# Patient Record
Sex: FEMALE | Race: OTHER | HISPANIC OR LATINO | URBAN - METROPOLITAN AREA
[De-identification: names, ages, dates, MRNs, and addresses within clinical notes are randomized per-mention and may not be internally consistent; named-entity substitution may affect disease eponyms.]

---

## 2023-03-07 ENCOUNTER — EMERGENCY (EMERGENCY)
Facility: HOSPITAL | Age: 28
LOS: 1 days | Discharge: HOME | End: 2023-03-07
Attending: INTERNAL MEDICINE | Admitting: INTERNAL MEDICINE
Payer: MEDICAID

## 2023-03-07 ENCOUNTER — APPOINTMENT (OUTPATIENT)
Dept: OBGYN | Facility: CLINIC | Age: 28
End: 2023-03-07

## 2023-03-07 VITALS
OXYGEN SATURATION: 98 % | WEIGHT: 169.98 LBS | HEART RATE: 82 BPM | TEMPERATURE: 97 F | HEIGHT: 65 IN | RESPIRATION RATE: 17 BRPM | DIASTOLIC BLOOD PRESSURE: 82 MMHG | SYSTOLIC BLOOD PRESSURE: 122 MMHG

## 2023-03-07 PROBLEM — Z00.00 ENCOUNTER FOR PREVENTIVE HEALTH EXAMINATION: Status: ACTIVE | Noted: 2023-03-07

## 2023-03-07 PROCEDURE — 87077 CULTURE AEROBIC IDENTIFY: CPT

## 2023-03-07 PROCEDURE — 87070 CULTURE OTHR SPECIMN AEROBIC: CPT

## 2023-03-07 PROCEDURE — 10061 I&D ABSCESS COMP/MULTIPLE: CPT

## 2023-03-07 PROCEDURE — 99284 EMERGENCY DEPT VISIT MOD MDM: CPT | Mod: 25

## 2023-03-07 PROCEDURE — 56405 I&D VULVA/PERINEAL ABSCESS: CPT

## 2023-03-07 PROCEDURE — 99283 EMERGENCY DEPT VISIT LOW MDM: CPT | Mod: 25

## 2023-03-07 RX ORDER — IBUPROFEN 200 MG
1 TABLET ORAL
Qty: 12 | Refills: 0
Start: 2023-03-07 | End: 2023-03-09

## 2023-03-07 RX ADMIN — Medication 1 TABLET(S): at 13:26

## 2023-03-07 NOTE — ED PROVIDER NOTE - PATIENT PORTAL LINK FT
You can access the FollowMyHealth Patient Portal offered by [facility] by registering at the following website: [email or web address]. By joining [facility]’s FollowMyHealth portal, you will also be able to view your health information using other applications (apps) compatible with our system.

## 2023-03-07 NOTE — ED ADULT NURSE NOTE - HISTORY OF COVID-19 VACCINATION
LVMTCB  Repeat CT was missed prior to appt tomorrow. Pt can do a walk in [facility] [address] if time permits. Transportation is not able to change  time. Patient should arrive about 2 pm to [facility].   Yes

## 2023-03-07 NOTE — ED ADULT NURSE NOTE - OBJECTIVE STATEMENT
Pt came from home with c/o right labial abscess x 6 days. Pt with hx of 3 occurrences of labial abscess in the past. Pt stating that the abscess is painful, rating the pain 7/10. No fever/chills, abdominal pain, n/v/d, urinary symptoms or any other complaints.

## 2023-03-07 NOTE — ED PROVIDER NOTE - NSFOLLOWUPINSTRUCTIONS_ED_ALL_ED_FT
Quiste de Anneliese    Bartholin's Cyst       El quiste de Anneliese es un saco lleno de líquido que se forma huber resultado de sanrda obstrucción a lo dom del tubo (conducto) de la glándula de Anneliese. Las glándulas de Anneliese son pequeñas glándulas ubicadas en los pliegues de la piel alrededor del orificio de la vagina (labios de la vulva). Estas glándulas producen un líquido que humedece o lubrica la parte externa de la vagina jules las relaciones sexuales.    Un quiste que no es ankit ni está infectado puede no causar problemas ni requerir tratamiento. Si el quiste se infecta con bacterias, se denomina absceso de Anneliese. Un absceso puede causar síntomas huber dolor e hinchazón y es más probable que requiera tratamiento.      ¿Cuáles son las causas?    Esta afección puede producirse por un conducto de la glándula de Anneliese obstruido. Estos conductos pueden obstruirse debido a la acumulación natural de líquidos y aceites. La presencia de bacterias dentro del quiste puede causar sandra infección.    En muchos de los casos, se desconoce la causa.      ¿Cuáles son los signos o síntomas?    Entre los síntomas, se pueden incluir los siguientes:  •Un bulto o sandra hinchazón en los labios de la vulva, cerca del orificio inferior de la vagina.      •Molestias o dolor. [place] puede empeorar al tener relaciones sexuales o al caminar.      •Enrojecimiento, hinchazón y secreción de líquido en el área. Estos podrían ser signos de un absceso.      La gravedad de los síntomas depende del tamaño del quiste y si está infectado. La infección agrava los síntomas.      ¿Cómo se diagnostica?    Esta afección se puede diagnosticar en función de lo siguiente:  •Los síntomas y los antecedentes médicos.      •Un examen físico para detectar hinchazón en el área vaginal. Es posible que deba acostarse boca arriba en sandra ginny con los pies en los soportes para que le realicen el examen.      •Análisis de lloyd para verificar si hay infecciones.      •Extracción de sandra muestra de líquido del quiste o absceso (biopsia) para realizar pruebas.      Es posible que trabaje con un médico especializado en la debby de las mujeres (ginecólogo) para recibir un diagnóstico y tratamiento.      ¿Cómo se trata?    Si el quiste es pequeño, no está infectado y no causa síntomas, moon vez no sea necesario administrar tratamiento. Estos quistes a menudo desaparecen por sí solos, con cuidados en el hogar huber judith calientes o compresas tibias.    Si tiene un quiste o absceso ankit, el tratamiento puede incluir lo siguiente:  •Antibióticos.    •Un procedimiento para drenar el líquido que se encuentra en el interior del quiste o absceso. Estos procedimientos involucran sandra incisión en el quiste o absceso para drenar el líquido y luego puede realizarse alguno de los siguientes procedimientos:  •Puede colocarse un pequeño tubo scott (catéter) dentro del quiste o absceso para evitar que se cierre y vuelva a llenarse de líquido (fistulización). El catéter se extraerá en la visita de seguimiento.      •Los bordes de la incisión pueden suturarse con puntos en la piel para que el quiste o absceso se mantenga abierto (marsupialización). [place] permite que siga drenando y no se vuelva a llenar de líquido.        Si tiene quistes o abscesos que vuelven a formarse (recurrentes) y ayala requerido incisión y drenaje muchas veces, el médico puede plantearle la posibilidad de sandra cirugía para extirpar la glándula de Bartolino.      Siga estas instrucciones en arambula casa:    Medicamentos     •Use los medicamentos de venta makayla y los recetados solamente huber se lo haya indicado el médico.      •Si le recetaron un antibiótico, tómelo huber se lo haya indicado el médico. No deje de jian o usar el antibiótico aunque la afección mejore.      Control del dolor y de la hinchazón     •Pruebe con un baño de asiento para disminuir el dolor y la hinchazón. Un baño de asiento es un baño con agua tibia en el cual el agua solo le llega hasta la cadera y debe cubrir las nalgas. Puede jian judith de asiento varias veces al día.    •Aplique calor en la felecia afectada con la frecuencia que sea necesaria. Use la drew de calor que el médico le recomiende, huber sandra compresa de calor húmedo o sandra almohadilla térmica.   •Coloque sandra toalla entre la piel y la drew de calor.       •Aplique calor jules 20 a 30 minutos.       •Retire la drew de calor si la piel se pone de color rogers brillante. [place] es especialmente importante si no puede sentir dolor, calor o frío. Puede correr un riesgo mayor de sufrir quemaduras. No se quede dormida con la almohadilla térmica colocada.        Indicaciones generales     •Si se drenó el quiste o absceso, siga las indicaciones del médico acerca del cuidado de la herida. Utilice toallas femeninas cuando lo necesite para absorber cualquier secreción.      • No apriete ni kierra presión sobre el quiste.      • No tenga relaciones sexuales hasta que el quiste haya desaparecido o la herida del drenaje haya sanado.    •[place] estas medidas para ayudar a prevenir que vuelva a formarse un quiste de Bartolino y que se desarrollen otros quistes de Bartolino:  •[place] un baño o sandra ducha sandra vez día. Higienice el área vaginal con agua y un jabón suave cuando se bañe.      •Practique el sexo seguro para prevenir las ITS. Hable con el médico sobre cómo prevenir las ITS y qué formas de métodos de control de la natalidad (anticonceptivos) pueden ser mejores para usted.        •Cumpla con todas las visitas de seguimiento. [place] es importante.        Comuníquese con un médico si:    •Tiene fiebre.      •Tiene más enrojecimiento, hinchazón o dolor alrededor del quiste.      •Tiene líquido, lloyd, pus o mal olor que proviene del quiste.      •Tiene un quiste que aumenta de tamaño o vuelve a formarse.        Resumen    •El quiste de Bartolino es un saco lleno de líquido que se forma huber resultado de sandra obstrucción a lo dom del conducto de la glándula de Bartolino.      •Si el quiste es pequeño, no está infectado y no causa síntomas, moon vez no sea necesario administrar tratamiento.      •Si tiene un quiste o absceso ankit, es posible que el médico realice un procedimiento para drenar el líquido.      •Si tiene quistes o abscesos que vuelven a formarse (recurrentes) y ayala requerido incisión y drenaje muchas veces, el médico puede plantearle la posibilidad de sandra cirugía para extirpar la glándula de Bartolino.      Esta información no tiene huber fin reemplazar el consejo del médico. Asegúrese de hacerle al médico cualquier pregunta que tenga.      Document Revised: 07/09/2021 Document Reviewed: 07/09/2021    Elsevier Patient Education © 2023 Elsevier Inc. Kierra un seguimiento con la clínica según las indicaciones.  Mantenga el embalaje en arambula lugar hasta la melo fu  judith tibios  [place] Augmentin según lo prescrito  [place] motrin 600 mg cada 6 horas según sea necesario para el dolor  Regrese al servicio de urgencias si hay algún empeoramiento o síntomas persistentes.        Quiste de J[place]ance    Barthyovani's Cyst       El quiste de Bartolino es un saco lleno de líquido que se forma huber resultado de sandra obstrucción a lo dom del tubo (conducto) de la glándula de Bartolino. Las glándulas de Bartolino son pequeñas glándulas ubicadas en los pliegues de la piel alrededor del orificio de la vagina (labios de la vulva). Estas glándulas producen un líquido que humedece o lubrica la parte externa de la vagina jules las relaciones sexuales.    Un quiste que no es ankit ni está infectado puede no causar problemas ni requerir tratamiento. Si el quiste se infecta con bacterias, se denomina absceso de Josseolino. Un absceso puede causar síntomas huber dolor e hinchazón y es más probable que requiera tratamiento.      ¿Cuáles son las causas?    Esta afección puede producirse por un conducto de la glándula de Bartlance obstruido. Estos conductos pueden obstruirse debido a la acumulación natural de líquidos y aceites. La presencia de bacterias dentro del quiste puede causar sandra infección.    En muchos de los casos, se desconoce la causa.      ¿Cuáles son los signos o síntomas?    Entre los síntomas, se pueden incluir los siguientes:  •Un bulto o sandra hinchazón en los labios de la vulva, cerca del orificio inferior de la vagina.      •Molestias o dolor. [place] puede empeorar al tener relaciones sexuales o al caminar.      •Enrojecimiento, hinchazón y secreción de líquido en el área. Estos podrían ser signos de un absceso.      La gravedad de los síntomas depende del tamaño del quiste y si está infectado. La infección agrava los síntomas.      ¿Cómo se diagnostica?    Esta afección se puede diagnosticar en función de lo siguiente:  •Los síntomas y los antecedentes médicos.      •Un examen físico para detectar hinchazón en el área vaginal. Es posible que deba acostarse boca arriba en sandra ginny con los pies en los soportes para que le realicen el examen.      •Análisis de lloyd para verificar si hay infecciones.      •Extracción de sandra muestra de líquido del quiste o absceso (biopsia) para realizar pruebas.      Es posible que trabaje con un médico especializado en la debby de las mujeres (ginecólogo) para recibir un diagnóstico y tratamiento.      ¿Cómo se trata?    Si el quiste es pequeño, no está infectado y no causa síntomas, moon vez no sea necesario administrar tratamiento. Estos quistes a menudo desaparecen por sí solos, con cuidados en el hogar huber judith calientes o compresas tibias.    Si tiene un quiste o absceso ankit, el tratamiento puede incluir lo siguiente:  •Antibióticos.    •Un procedimiento para drenar el líquido que se encuentra en el interior del quiste o absceso. Estos procedimientos involucran sandra incisión en el quiste o absceso para drenar el líquido y luego puede realizarse alguno de los siguientes procedimientos:  •Puede colocarse un pequeño tubo scott (catéter) dentro del quiste o absceso para evitar que se cierre y vuelva a llenarse de líquido (fistulización). El catéter se extraerá en la visita de seguimiento.      •Los bordes de la incisión pueden suturarse con puntos en la piel para que el quiste o absceso se mantenga abierto (marsupialización). [place] permite que siga drenando y no se vuelva a llenar de líquido.        Si tiene quistes o abscesos que vuelven a formarse (recurrentes) y ayala requerido incisión y drenaje muchas veces, el médico puede plantearle la posibilidad de sandra cirugía para extirpar la glándula de Bartolino.      Siga estas instrucciones en arambula casa:    Medicamentos     •Use los medicamentos de venta makayla y los recetados solamente huber se lo haya indicado el médico.      •Si le recetaron un antibiótico, tómelo huber se lo haya indicado el médico. No deje de jian o usar el antibiótico aunque la afección mejore.      Control del dolor y de la hinchazón     •Pruebe con un baño de asiento para disminuir el dolor y la hinchazón. Un baño de asiento es un baño con agua tibia en el cual el agua solo le llega hasta la cadera y debe cubrir las nalgas. Puede jian judith de asiento varias veces al día.    •Aplique calor en la felecia afectada con la frecuencia que sea necesaria. Use la drew de calor que el médico le recomiende, huber sandra compresa de calor húmedo o sandra almohadilla térmica.   •Coloque sandra toalla entre la piel y la drew de calor.       •Aplique calor jules 20 a 30 minutos.       •Retire la drew de calor si la piel se pone de color rogers brillante. [place] es especialmente importante si no puede sentir dolor, calor o frío. Puede correr un riesgo mayor de sufrir quemaduras. No se quede dormida con la almohadilla térmica colocada.        Indicaciones generales     •Si se drenó el quiste o absceso, siga las indicaciones del médico acerca del cuidado de la herida. Utilice toallas femeninas cuando lo necesite para absorber cualquier secreción.      • No apriete ni kierra presión sobre el quiste.      • No tenga relaciones sexuales hasta que el quiste haya desaparecido o la herida del drenaje haya sanado.    •[place] estas medidas para ayudar a prevenir que vuelva a formarse un quiste de Bartolino y que se desarrollen otros quistes de Bartolino:  •[place] un baño o sandra ducha sandra vez día. Higien[place] el área vaginal con agua y un jabón suave cuando se bañe.      •Practique el sexo seguro para prevenir las ITS. Hable con el médico sobre cómo prevenir las ITS y qué formas de métodos de control de la natalidad (anticonceptivos) pueden ser mejores para usted.        •[place]um[place] con todas las visitas de seguimiento. [place] es importante.        Comuníquese con un médico si:    •Tiene fiebre.      •Tiene más enrojecimiento, hinchazón o dolor alrededor del quiste.      •Tiene líquido, lloyd, pus o mal olor que proviene del quiste.      •Tiene un quiste que aumenta de tamaño o vuelve a formarse.        Resumen    •El quiste de Bartolino es un saco lleno de líquido que se forma huber resultado de sandra obstrucción a lo dom del conducto de la glándula de Bartolino.      •Si el quiste es pequeño, no está infectado y no causa síntomas, moon vez no sea necesario administrar tratamiento.      •Si tiene un quiste o absceso ankit, es posible que el médico realice un procedimiento para drenar el líquido.      •Si tiene quistes o abscesos que vuelven a formarse (recurrentes) y ayala requerido incisión y drenaje muchas veces, el médico puede plantearle la posibilidad de sandra cirugía para extirpar la glándula de Bartolino.      Esta información no tiene huber fin reemplazar el consejo del médico. Asegúrese de hacerle al médico cualquier pregunta que tenga.      Document Revised: 07/09/2021 Document Reviewed: 07/09/2021    Elsevier Patient Education © 2023 Elsevier Inc.

## 2023-03-07 NOTE — ED PROVIDER NOTE - CHIEF COMPLAINT
Angela Catalanid is calling she has had this cough that real strong and she is wondering if it is a sinus infection and if she should be on something for that?   Also she said that she is very nauseous and is wondering if there is something that Dr Manan Santos would give The patient is a 27y Female complaining of abscess.

## 2023-03-07 NOTE — ED PROVIDER NOTE - OBJECTIVE STATEMENT
27 year old female with no significant PMH presents with an abscess in the vaginal area x 6 days. Patient notes to feel pricking sensation, rates the pain 7/10 and indicates that the pain has been getting significantly worse. She has had 3 episodes in the past, last one being 3 years ago.  She took 1 tablet of amoxicillin yesterday which she had at home. Denies dysuria, abdominal pain, N/V, hematuria, vaginal discharge. She also notes to have oral HSV flare up which started 4 days ago- last one being 3 years ago. LMP was 02/05/2023- never been pregnant, G0. Been sexually active with 1 partner in the last 6 months, never had STD screening. 27 year old female with no significant PMH presents with an abscess in the vaginal area x 6 days. Patient notes to feel pricking sensation, rates the pain 7/10 and indicates that the pain has been getting significantly worse. She has had 3 episodes in the past, last one being 3 years ago.  She took 1 tablet of amoxicillin yesterday which she had at home. Denies dysuria, abdominal pain, N/V, hematuria, vaginal discharge. She also notes to have oral HSV flare up which started 4 days ago- last one being 3 years ago. LMP was 02/05/2023- never been pregnant, G0. Been sexually active with 1 partner in the last 6 months, never had STD screening. pt has no OBGYN, and reports her aunt is an MD who drained abscess in the past. 27 year old female with no significant PMH presents with an abscess in the vaginal area x 6 days. Patient notes to feel pricking sensation, rates the pain 7/10 and indicates that the pain has been getting significantly worse. She has had 3 episodes in the past, last one being 3 years ago. Patient notes to have taken ibuprofen for pain this morning. She took 1 tablet of amoxicillin yesterday which she had at home. Denies dysuria, abdominal pain, N/V, hematuria, vaginal discharge. She also notes to have oral HSV flare up which started 4 days ago- last one being 3 years ago. LMP was 02/05/2023- never been pregnant, G0. Been sexually active with 1 partner in the last 6 months, never had STD screening. pt has no OBGYN, and reports her aunt is an MD who drained abscess in the past.

## 2023-03-07 NOTE — ED PROVIDER NOTE - CLINICAL SUMMARY MEDICAL DECISION MAKING FREE TEXT BOX
27 year old female with no significant PMH presents with an abscess in the vaginal area x 6 days. Patient notes to feel pricking sensation, rates the pain 7/10 and indicates that the pain has been getting significantly worse. She has had 3 episodes in the past, last one being 3 years ago.  She took 1 tablet of amoxicillin yesterday which she had at home. Denies dysuria, abdominal pain, N/V, hematuria, vaginal discharge. She also notes to have oral HSV flare up which started 4 days ago- last one being 3 years ago. LMP was 02/05/2023- never been pregnant, G0. Been sexually active with 1 partner in the last 6 months, never had STD screening. pt has no OBGYN, and reports her aunt is an MD who drained abscess in the past.  left labia abscess, 4 cm x 3 cm   I and D performed and pt tolerated. wound culture sent, pt sent home on augmentin and will follow up with family practice on friday.  pt stable for dc.

## 2023-03-07 NOTE — ED PROVIDER NOTE - ATTENDING APP SHARED VISIT CONTRIBUTION OF CARE
27 year old female with no significant PMH presents with an abscess in the vaginal area x 6 days. Patient notes to feel pricking sensation, rates the pain 7/10 and indicates that the pain has been getting significantly worse. She has had 3 episodes in the past, last one being 3 years ago.  She took 1 tablet of amoxicillin yesterday which she had at home. Denies dysuria, abdominal pain, N/V, hematuria, vaginal discharge. She also notes to have oral HSV flare up which started 4 days ago- last one being 3 years ago. LMP was 02/05/2023- never been pregnant, G0. Been sexually active with 1 partner in the last 6 months, never had STD screening. pt has no OBGYN, and reports her aunt is an MD who drained abscess in the past.  left labia abscess, 4 cm x 3 cm   I and D performed and pt tolerated. wound culture sent, pt sent home on augmentin and will follow up with family practice on friday.  pt stable for dc.  Dr. Bose:  I have reviewed and discussed with the PA/ resident the case specifics, including the history, physical assessment, evaluation, conclusion, laboratory results, and medical plan. I agree with the contents, and conclusions. I have personally examined, and interviewed the patient.

## 2023-03-08 PROBLEM — Z78.9 OTHER SPECIFIED HEALTH STATUS: Chronic | Status: ACTIVE | Noted: 2023-03-07

## 2023-03-09 ENCOUNTER — APPOINTMENT (OUTPATIENT)
Dept: FAMILY MEDICINE | Facility: HOSPITAL | Age: 28
End: 2023-03-09

## 2023-03-09 ENCOUNTER — OUTPATIENT (OUTPATIENT)
Dept: OUTPATIENT SERVICES | Facility: HOSPITAL | Age: 28
LOS: 1 days | End: 2023-03-09
Payer: SELF-PAY

## 2023-03-09 ENCOUNTER — MED ADMIN CHARGE (OUTPATIENT)
Age: 28
End: 2023-03-09

## 2023-03-09 VITALS
HEART RATE: 72 BPM | WEIGHT: 168 LBS | SYSTOLIC BLOOD PRESSURE: 117 MMHG | TEMPERATURE: 98.6 F | RESPIRATION RATE: 14 BRPM | HEIGHT: 65 IN | DIASTOLIC BLOOD PRESSURE: 81 MMHG | OXYGEN SATURATION: 99 % | BODY MASS INDEX: 27.99 KG/M2

## 2023-03-09 DIAGNOSIS — Z70.8 OTHER SEX COUNSELING: ICD-10-CM

## 2023-03-09 DIAGNOSIS — Z00.00 ENCOUNTER FOR GENERAL ADULT MEDICAL EXAMINATION WITHOUT ABNORMAL FINDINGS: ICD-10-CM

## 2023-03-09 LAB
CULTURE RESULTS: SIGNIFICANT CHANGE UP
SPECIMEN SOURCE: SIGNIFICANT CHANGE UP

## 2023-03-09 PROCEDURE — 80061 LIPID PANEL: CPT

## 2023-03-09 PROCEDURE — 80053 COMPREHEN METABOLIC PANEL: CPT

## 2023-03-09 PROCEDURE — 87389 HIV-1 AG W/HIV-1&-2 AB AG IA: CPT

## 2023-03-09 PROCEDURE — G0463: CPT

## 2023-03-09 PROCEDURE — 86780 TREPONEMA PALLIDUM: CPT

## 2023-03-13 PROBLEM — Z70.8 SEXUALLY TRANSMITTED DISEASE COUNSELING: Status: ACTIVE | Noted: 2023-03-09

## 2023-03-13 LAB
ALBUMIN SERPL ELPH-MCNC: 4.3 G/DL
ALP BLD-CCNC: 118 U/L
ALT SERPL-CCNC: 75 U/L
ANION GAP SERPL CALC-SCNC: 15 MMOL/L
AST SERPL-CCNC: 47 U/L
BILIRUB SERPL-MCNC: <0.2 MG/DL
BUN SERPL-MCNC: 15 MG/DL
C TRACH RRNA SPEC QL NAA+PROBE: NOT DETECTED
CALCIUM SERPL-MCNC: 9.6 MG/DL
CHLORIDE SERPL-SCNC: 105 MMOL/L
CHOLEST SERPL-MCNC: 256 MG/DL
CO2 SERPL-SCNC: 21 MMOL/L
CREAT SERPL-MCNC: 0.55 MG/DL
EGFR: 130 ML/MIN/1.73M2
GLUCOSE SERPL-MCNC: 88 MG/DL
HDLC SERPL-MCNC: 92 MG/DL
HIV1+2 AB SPEC QL IA.RAPID: NONREACTIVE
LDLC SERPL CALC-MCNC: 136 MG/DL
N GONORRHOEA RRNA SPEC QL NAA+PROBE: NOT DETECTED
NONHDLC SERPL-MCNC: 164 MG/DL
POTASSIUM SERPL-SCNC: 4.2 MMOL/L
PROT SERPL-MCNC: 7.3 G/DL
SODIUM SERPL-SCNC: 141 MMOL/L
SOURCE AMPLIFICATION: NORMAL
T PALLIDUM AB SER QL IA: NEGATIVE
TRIGL SERPL-MCNC: 142 MG/DL

## 2023-03-14 DIAGNOSIS — Z23 ENCOUNTER FOR IMMUNIZATION: ICD-10-CM

## 2023-03-14 DIAGNOSIS — Z30.9 ENCOUNTER FOR CONTRACEPTIVE MANAGEMENT, UNSPECIFIED: ICD-10-CM

## 2023-03-14 NOTE — HISTORY OF PRESENT ILLNESS
[de-identified] : 28 YO F presents as a new patient for follow up of Bartholin cyst removal. Patient was seen in Emergency room and had I+D, Per patient this is her second time with Bartholin cyst removal. Patient states that she does shave her pubic area. Patient has had this happen once before and also resulted in I+D. History of tonsil removal and appendicitis. She has no allergies and does not take any medications. Lives with cousin and sister. Family history of HTN in father, DM in grandfather. \par \par

## 2023-03-14 NOTE — HEALTH RISK ASSESSMENT
[Employed] : employed [Single] : single [Sexually Active] : sexually active [Feels Safe at Home] : Feels safe at home [Former] : Former [Good] : ~his/her~  mood as  good [No] : No [High Risk Behavior] : no high risk behavior [Reports changes in hearing] : Reports no changes in hearing [Reports changes in vision] : Reports no changes in vision [Reports normal functional visual acuity (ie: able to read med bottle)] : Reports poor functional visual acuity.  [Reports changes in dental health] : Reports no changes in dental health [de-identified] : Taking OCP,

## 2023-04-10 ENCOUNTER — APPOINTMENT (OUTPATIENT)
Dept: FAMILY MEDICINE | Facility: HOSPITAL | Age: 28
End: 2023-04-10

## 2023-04-10 ENCOUNTER — OUTPATIENT (OUTPATIENT)
Dept: OUTPATIENT SERVICES | Facility: HOSPITAL | Age: 28
LOS: 1 days | End: 2023-04-10
Payer: SELF-PAY

## 2023-04-10 VITALS
BODY MASS INDEX: 27.62 KG/M2 | RESPIRATION RATE: 14 BRPM | DIASTOLIC BLOOD PRESSURE: 81 MMHG | HEART RATE: 77 BPM | WEIGHT: 166 LBS | OXYGEN SATURATION: 99 % | TEMPERATURE: 98.6 F | SYSTOLIC BLOOD PRESSURE: 123 MMHG

## 2023-04-10 DIAGNOSIS — Z12.4 ENCOUNTER FOR SCREENING FOR MALIGNANT NEOPLASM OF CERVIX: ICD-10-CM

## 2023-04-10 DIAGNOSIS — Z82.49 FAMILY HISTORY OF ISCHEMIC HEART DISEASE AND OTHER DISEASES OF THE CIRCULATORY SYSTEM: ICD-10-CM

## 2023-04-10 DIAGNOSIS — Z00.00 ENCOUNTER FOR GENERAL ADULT MEDICAL EXAMINATION WITHOUT ABNORMAL FINDINGS: ICD-10-CM

## 2023-04-10 DIAGNOSIS — Z87.19 PERSONAL HISTORY OF OTHER DISEASES OF THE DIGESTIVE SYSTEM: ICD-10-CM

## 2023-04-10 PROCEDURE — G0463: CPT

## 2023-04-10 PROCEDURE — 36415 COLL VENOUS BLD VENIPUNCTURE: CPT

## 2023-04-10 PROCEDURE — 80074 ACUTE HEPATITIS PANEL: CPT

## 2023-04-10 NOTE — PHYSICAL EXAM
[Normal] : normal sclera/conjunctiva, pupils are equal, round and reactive to light and extraocular movements are intact [No Respiratory Distress] : no respiratory distress  [No Accessory Muscle Use] : no accessory muscle use [Clear to Auscultation] : lungs were clear to auscultation bilaterally [Normal Rate] : normal rate  [Regular Rhythm] : with a regular rhythm [Soft] : abdomen soft [Non Tender] : non-tender [Non-distended] : non-distended [No Masses] : no abdominal mass palpated [External Female Genitalia] : normal external genitalia [Vagina] : normal vaginal exam [Cervix] : normal cervix [No Joint Swelling] : no joint swelling [Grossly Normal Strength/Tone] : grossly normal strength/tone [Coordination Grossly Intact] : coordination grossly intact [No Focal Deficits] : no focal deficits [Normal Affect] : the affect was normal [Normal Insight/Judgement] : insight and judgment were intact

## 2023-04-10 NOTE — INTERPRETER SERVICES
[Other: ______] : provided by ASHLEY [Interpreters_IDNumber] : [id number] [Interpreters_FullName] : christian [TWNoteComboBox1] : Afghan

## 2023-04-10 NOTE — HISTORY OF PRESENT ILLNESS
[FreeTextEntry1] : vaccination, f/u transaminitis [de-identified] : Patient is a 28yo F who presents for vaccination and f/u transaminitis. Last visit on 3/9/23 for f/u bartholin cyst s/p I&D, conceptive management, and STI testing. OCP was prescribed and STI testing negative. LFTs elevated, AST 47, ALT 75. No acute complaints or concerns today.

## 2023-04-11 DIAGNOSIS — R74.01 ELEVATION OF LEVELS OF LIVER TRANSAMINASE LEVELS: ICD-10-CM

## 2023-04-11 DIAGNOSIS — Z12.4 ENCOUNTER FOR SCREENING FOR MALIGNANT NEOPLASM OF CERVIX: ICD-10-CM

## 2023-04-11 DIAGNOSIS — Z23 ENCOUNTER FOR IMMUNIZATION: ICD-10-CM

## 2023-04-11 LAB
HAV IGM SER QL: NONREACTIVE
HBV CORE IGM SER QL: NONREACTIVE
HBV SURFACE AG SER QL: NONREACTIVE
HCV AB SER QL: NONREACTIVE
HCV S/CO RATIO: 0.08 S/CO

## 2023-04-14 LAB — CYTOLOGY CVX/VAG DOC THIN PREP: NORMAL

## 2023-04-19 ENCOUNTER — RESULT REVIEW (OUTPATIENT)
Age: 28
End: 2023-04-19

## 2023-04-19 ENCOUNTER — OUTPATIENT (OUTPATIENT)
Dept: OUTPATIENT SERVICES | Facility: HOSPITAL | Age: 28
LOS: 1 days | End: 2023-04-19
Payer: SELF-PAY

## 2023-04-19 DIAGNOSIS — R74.01 ELEVATION OF LEVELS OF LIVER TRANSAMINASE LEVELS: ICD-10-CM

## 2023-04-19 DIAGNOSIS — Z00.00 ENCOUNTER FOR GENERAL ADULT MEDICAL EXAMINATION WITHOUT ABNORMAL FINDINGS: ICD-10-CM

## 2023-04-19 PROCEDURE — 76700 US EXAM ABDOM COMPLETE: CPT

## 2023-04-19 PROCEDURE — 76700 US EXAM ABDOM COMPLETE: CPT | Mod: 26

## 2023-05-10 ENCOUNTER — TRANSCRIPTION ENCOUNTER (OUTPATIENT)
Age: 28
End: 2023-05-10

## 2023-05-12 ENCOUNTER — APPOINTMENT (OUTPATIENT)
Dept: FAMILY MEDICINE | Facility: HOSPITAL | Age: 28
End: 2023-05-12

## 2023-05-12 ENCOUNTER — OUTPATIENT (OUTPATIENT)
Dept: OUTPATIENT SERVICES | Facility: HOSPITAL | Age: 28
LOS: 1 days | End: 2023-05-12
Payer: SELF-PAY

## 2023-05-12 VITALS
SYSTOLIC BLOOD PRESSURE: 110 MMHG | RESPIRATION RATE: 16 BRPM | HEART RATE: 84 BPM | BODY MASS INDEX: 28.12 KG/M2 | DIASTOLIC BLOOD PRESSURE: 69 MMHG | WEIGHT: 169 LBS | TEMPERATURE: 98.7 F | OXYGEN SATURATION: 98 %

## 2023-05-12 DIAGNOSIS — N75.0 CYST OF BARTHOLIN'S GLAND: ICD-10-CM

## 2023-05-12 DIAGNOSIS — Z00.00 ENCOUNTER FOR GENERAL ADULT MEDICAL EXAMINATION WITHOUT ABNORMAL FINDINGS: ICD-10-CM

## 2023-05-12 PROCEDURE — G0463: CPT

## 2023-05-15 DIAGNOSIS — N75.0 CYST OF BARTHOLIN'S GLAND: ICD-10-CM

## 2023-05-17 NOTE — REVIEW OF SYSTEMS
[Negative] : Gastrointestinal [Dysuria] : no dysuria [Hematuria] : no hematuria [Vaginal Discharge] : no vaginal discharge [FreeTextEntry8] : Left sided labial cyst but

## 2023-05-17 NOTE — HISTORY OF PRESENT ILLNESS
[FreeTextEntry8] : Pt is a 29 yo F, no significant PMH, presenting for recurrent Bartholin's Cyst. Of note, pt has previously been seen on 3/7/23 for Bartholin's cyst in the ED, and had I&D at that time. Today she c/o recurrent Bartholin's Cyst that formed a few days ago, although notes that it has been draining and also decreasing in size, with no current pain in the area. Pt denies fevers, chills, fluctuance, other urogenital complaints. No other urgent sxs or complaints endorsed at this time.\par

## 2023-05-17 NOTE — PHYSICAL EXAM
[No Acute Distress] : no acute distress [Well-Appearing] : well-appearing [Normal Voice/Communication] : normal voice/communication [Normal Sclera/Conjunctiva] : normal sclera/conjunctiva [Normal Outer Ear/Nose] : the outer ears and nose were normal in appearance [Supple] : supple [No Respiratory Distress] : no respiratory distress  [No Accessory Muscle Use] : no accessory muscle use [Clear to Auscultation] : lungs were clear to auscultation bilaterally [Normal Rate] : normal rate  [Regular Rhythm] : with a regular rhythm [Normal S1, S2] : normal S1 and S2 [Soft] : abdomen soft [Non Tender] : non-tender [Non-distended] : non-distended [de-identified] : 2 cm, firm, palpable mass on the inferior aspect of the left labia major, no current discharge but duct opening noted.

## 2023-12-13 ENCOUNTER — APPOINTMENT (OUTPATIENT)
Dept: FAMILY MEDICINE | Facility: HOSPITAL | Age: 28
End: 2023-12-13

## 2023-12-21 ENCOUNTER — RESULT CHARGE (OUTPATIENT)
Age: 28
End: 2023-12-21

## 2023-12-22 ENCOUNTER — APPOINTMENT (OUTPATIENT)
Dept: FAMILY MEDICINE | Facility: HOSPITAL | Age: 28
End: 2023-12-22
Payer: COMMERCIAL

## 2023-12-22 ENCOUNTER — MED ADMIN CHARGE (OUTPATIENT)
Age: 28
End: 2023-12-22

## 2023-12-22 ENCOUNTER — OUTPATIENT (OUTPATIENT)
Dept: OUTPATIENT SERVICES | Facility: HOSPITAL | Age: 28
LOS: 1 days | End: 2023-12-22
Payer: MEDICAID

## 2023-12-22 VITALS
BODY MASS INDEX: 28.29 KG/M2 | HEART RATE: 76 BPM | RESPIRATION RATE: 16 BRPM | OXYGEN SATURATION: 98 % | TEMPERATURE: 98.5 F | SYSTOLIC BLOOD PRESSURE: 117 MMHG | DIASTOLIC BLOOD PRESSURE: 77 MMHG | WEIGHT: 170 LBS

## 2023-12-22 DIAGNOSIS — Z23 ENCOUNTER FOR IMMUNIZATION: ICD-10-CM

## 2023-12-22 DIAGNOSIS — R74.01 ELEVATION OF LEVELS OF LIVER TRANSAMINASE LEVELS: ICD-10-CM

## 2023-12-22 DIAGNOSIS — R07.89 OTHER CHEST PAIN: ICD-10-CM

## 2023-12-22 DIAGNOSIS — Z00.00 ENCOUNTER FOR GENERAL ADULT MEDICAL EXAMINATION WITHOUT ABNORMAL FINDINGS: ICD-10-CM

## 2023-12-22 DIAGNOSIS — Z01.818 ENCOUNTER FOR OTHER PREPROCEDURAL EXAMINATION: ICD-10-CM

## 2023-12-22 PROCEDURE — 85025 COMPLETE CBC W/AUTO DIFF WBC: CPT

## 2023-12-22 PROCEDURE — 80053 COMPREHEN METABOLIC PANEL: CPT

## 2023-12-22 PROCEDURE — 93005 ELECTROCARDIOGRAM TRACING: CPT

## 2023-12-22 PROCEDURE — 84702 CHORIONIC GONADOTROPIN TEST: CPT

## 2023-12-22 PROCEDURE — 85610 PROTHROMBIN TIME: CPT

## 2023-12-22 PROCEDURE — 36415 COLL VENOUS BLD VENIPUNCTURE: CPT

## 2023-12-22 PROCEDURE — G0463: CPT

## 2023-12-22 PROCEDURE — 85730 THROMBOPLASTIN TIME PARTIAL: CPT

## 2023-12-22 PROCEDURE — 83036 HEMOGLOBIN GLYCOSYLATED A1C: CPT

## 2023-12-22 PROCEDURE — 84443 ASSAY THYROID STIM HORMONE: CPT

## 2023-12-22 PROCEDURE — 93010 ELECTROCARDIOGRAM REPORT: CPT

## 2023-12-22 PROCEDURE — 87389 HIV-1 AG W/HIV-1&-2 AB AG IA: CPT

## 2023-12-22 NOTE — PHYSICAL EXAM
[No Acute Distress] : no acute distress [Well Nourished] : well nourished [Well Developed] : well developed [Normal] : soft, non-tender, non-distended, no masses palpated, no HSM and normal bowel sounds [Normal Gait] : normal gait [Normal Affect] : the affect was normal [Normal Insight/Judgement] : insight and judgment were intact

## 2023-12-22 NOTE — HISTORY OF PRESENT ILLNESS
[FreeTextEntry1] : HPV vaccine [de-identified] : 28F here for 2nd HPV vaccine. Last seen 5/12/23. Dose #1 was March 2023. Dose #2 today. Also amenable to flu vaccine. On chart review mildly elevated LFTs 3/2023: AST 47, ALT 75. Hep panel negative. Abd US 4/19/23 negative. Patient has no GI complaints. She denies nausea, vomiting, abd pain, fevers.

## 2023-12-23 ENCOUNTER — NON-APPOINTMENT (OUTPATIENT)
Age: 28
End: 2023-12-23

## 2023-12-23 LAB
ALBUMIN SERPL ELPH-MCNC: 4.3 G/DL
ALP BLD-CCNC: 106 U/L
ALT SERPL-CCNC: 75 U/L
ANION GAP SERPL CALC-SCNC: 13 MMOL/L
APTT BLD: 28.5 SEC
AST SERPL-CCNC: 38 U/L
BASOPHILS # BLD AUTO: 0.02 K/UL
BASOPHILS NFR BLD AUTO: 0.4 %
BILIRUB SERPL-MCNC: 0.4 MG/DL
BUN SERPL-MCNC: 12 MG/DL
CALCIUM SERPL-MCNC: 9.2 MG/DL
CHLORIDE SERPL-SCNC: 104 MMOL/L
CO2 SERPL-SCNC: 22 MMOL/L
CREAT SERPL-MCNC: 0.54 MG/DL
EGFR: 129 ML/MIN/1.73M2
EOSINOPHIL # BLD AUTO: 0.13 K/UL
EOSINOPHIL NFR BLD AUTO: 2.3 %
ESTIMATED AVERAGE GLUCOSE: 108 MG/DL
GLUCOSE SERPL-MCNC: 93 MG/DL
HBA1C MFR BLD HPLC: 5.4 %
HCG SERPL-MCNC: <1 MIU/ML
HCT VFR BLD CALC: 37.9 %
HGB BLD-MCNC: 12 G/DL
HIV1+2 AB SPEC QL IA.RAPID: NONREACTIVE
IMM GRANULOCYTES NFR BLD AUTO: 0.4 %
INR PPP: 0.9 RATIO
LYMPHOCYTES # BLD AUTO: 1.7 K/UL
LYMPHOCYTES NFR BLD AUTO: 30.6 %
MAN DIFF?: NORMAL
MCHC RBC-ENTMCNC: 26.8 PG
MCHC RBC-ENTMCNC: 31.7 GM/DL
MCV RBC AUTO: 84.8 FL
MONOCYTES # BLD AUTO: 0.38 K/UL
MONOCYTES NFR BLD AUTO: 6.8 %
NEUTROPHILS # BLD AUTO: 3.3 K/UL
NEUTROPHILS NFR BLD AUTO: 59.5 %
PLATELET # BLD AUTO: 279 K/UL
POTASSIUM SERPL-SCNC: 4.2 MMOL/L
PROT SERPL-MCNC: 7 G/DL
PT BLD: 10.2 SEC
RBC # BLD: 4.47 M/UL
RBC # FLD: 13.7 %
SODIUM SERPL-SCNC: 138 MMOL/L
TSH SERPL-ACNC: 2.19 UIU/ML
WBC # FLD AUTO: 5.55 K/UL

## 2023-12-29 ENCOUNTER — APPOINTMENT (OUTPATIENT)
Dept: FAMILY MEDICINE | Facility: HOSPITAL | Age: 28
End: 2023-12-29

## 2023-12-29 ENCOUNTER — OUTPATIENT (OUTPATIENT)
Dept: OUTPATIENT SERVICES | Facility: HOSPITAL | Age: 28
LOS: 1 days | End: 2023-12-29
Payer: MEDICAID

## 2023-12-29 VITALS
WEIGHT: 170 LBS | TEMPERATURE: 98.5 F | RESPIRATION RATE: 16 BRPM | BODY MASS INDEX: 28.29 KG/M2 | OXYGEN SATURATION: 98 % | HEART RATE: 77 BPM | SYSTOLIC BLOOD PRESSURE: 111 MMHG | DIASTOLIC BLOOD PRESSURE: 72 MMHG

## 2023-12-29 DIAGNOSIS — Z01.818 ENCOUNTER FOR OTHER PREPROCEDURAL EXAMINATION: ICD-10-CM

## 2023-12-29 DIAGNOSIS — R74.01 ELEVATION OF LEVELS OF LIVER TRANSAMINASE LEVELS: ICD-10-CM

## 2023-12-29 DIAGNOSIS — Z00.00 ENCOUNTER FOR GENERAL ADULT MEDICAL EXAMINATION WITHOUT ABNORMAL FINDINGS: ICD-10-CM

## 2023-12-29 DIAGNOSIS — Z30.9 ENCOUNTER FOR CONTRACEPTIVE MANAGEMENT, UNSPECIFIED: ICD-10-CM

## 2023-12-29 PROCEDURE — G0463: CPT

## 2023-12-29 NOTE — HISTORY OF PRESENT ILLNESS
[No Pertinent Cardiac History] : no history of aortic stenosis, atrial fibrillation, coronary artery disease, recent myocardial infarction, or implantable device/pacemaker [No Pertinent Pulmonary History] : no history of asthma, COPD, sleep apnea, or smoking [No Adverse Anesthesia Reaction] : no adverse anesthesia reaction in self or family member [Chronic Anticoagulation] : no chronic anticoagulation [Chronic Kidney Disease] : no chronic kidney disease [FreeTextEntry4] : 28F h/o here for medical clearance for elective liposuction surgery in [place] scheduled for 2nd week of January. She has no complaints and is feeling well.   [FreeTextEntry7] : EKG

## 2023-12-29 NOTE — PHYSICAL EXAM
[Normal] : no carotid or abdominal bruits heard, no varicosities, pedal pulses are present, no peripheral edema, no extremity clubbing or cyanosis and no palpable aorta [Normal Gait] : normal gait [Normal Affect] : the affect was normal [Normal Insight/Judgement] : insight and judgment were intact

## 2024-05-24 RX ORDER — DESOGESTREL AND ETHINYL ESTRADIOL 0.15-0.03
0.15-3 KIT ORAL DAILY
Qty: 3 | Refills: 3 | Status: ACTIVE | COMMUNITY
Start: 2023-03-09 | End: 1900-01-01

## 2024-06-08 ENCOUNTER — APPOINTMENT (OUTPATIENT)
Dept: FAMILY MEDICINE | Facility: HOSPITAL | Age: 29
End: 2024-06-08

## 2024-07-31 ENCOUNTER — APPOINTMENT (OUTPATIENT)
Dept: FAMILY MEDICINE | Facility: HOSPITAL | Age: 29
End: 2024-07-31

## 2024-07-31 ENCOUNTER — MED ADMIN CHARGE (OUTPATIENT)
Age: 29
End: 2024-07-31

## 2024-07-31 ENCOUNTER — OUTPATIENT (OUTPATIENT)
Dept: OUTPATIENT SERVICES | Facility: HOSPITAL | Age: 29
LOS: 1 days | End: 2024-07-31
Payer: SELF-PAY

## 2024-07-31 VITALS
RESPIRATION RATE: 16 BRPM | SYSTOLIC BLOOD PRESSURE: 117 MMHG | DIASTOLIC BLOOD PRESSURE: 79 MMHG | TEMPERATURE: 98 F | HEART RATE: 79 BPM | OXYGEN SATURATION: 99 % | WEIGHT: 162 LBS | BODY MASS INDEX: 26.96 KG/M2

## 2024-07-31 DIAGNOSIS — Z23 ENCOUNTER FOR IMMUNIZATION: ICD-10-CM

## 2024-07-31 DIAGNOSIS — Z00.00 ENCOUNTER FOR GENERAL ADULT MEDICAL EXAMINATION W/OUT ABNORMAL FINDINGS: ICD-10-CM

## 2024-07-31 DIAGNOSIS — Z00.00 ENCOUNTER FOR GENERAL ADULT MEDICAL EXAMINATION WITHOUT ABNORMAL FINDINGS: ICD-10-CM

## 2024-07-31 DIAGNOSIS — R53.83 OTHER FATIGUE: ICD-10-CM

## 2024-07-31 PROCEDURE — 83550 IRON BINDING TEST: CPT

## 2024-07-31 PROCEDURE — 82728 ASSAY OF FERRITIN: CPT

## 2024-07-31 PROCEDURE — 84443 ASSAY THYROID STIM HORMONE: CPT

## 2024-07-31 PROCEDURE — G0463: CPT

## 2024-07-31 PROCEDURE — 83540 ASSAY OF IRON: CPT

## 2024-07-31 PROCEDURE — 85025 COMPLETE CBC W/AUTO DIFF WBC: CPT

## 2024-07-31 PROCEDURE — 36415 COLL VENOUS BLD VENIPUNCTURE: CPT

## 2024-07-31 PROCEDURE — 83036 HEMOGLOBIN GLYCOSYLATED A1C: CPT

## 2024-07-31 PROCEDURE — 90471 IMMUNIZATION ADMIN: CPT

## 2024-07-31 PROCEDURE — 84466 ASSAY OF TRANSFERRIN: CPT

## 2024-07-31 PROCEDURE — 80053 COMPREHEN METABOLIC PANEL: CPT

## 2024-07-31 PROCEDURE — 82652 VIT D 1 25-DIHYDROXY: CPT

## 2024-07-31 NOTE — PHYSICAL EXAM
[Normal Sclera/Conjunctiva] : normal sclera/conjunctiva [EOMI] : extraocular movements intact [No Focal Deficits] : no focal deficits [Normal Gait] : normal gait [Normal] : affect was normal and insight and judgment were intact

## 2024-08-02 ENCOUNTER — NON-APPOINTMENT (OUTPATIENT)
Age: 29
End: 2024-08-02

## 2024-08-02 LAB
24R-OH-CALCIDIOL SERPL-MCNC: 57.8 PG/ML
ALBUMIN SERPL ELPH-MCNC: 4.5 G/DL
ALP BLD-CCNC: 124 U/L
ALT SERPL-CCNC: 43 U/L
ANION GAP SERPL CALC-SCNC: 15 MMOL/L
AST SERPL-CCNC: 25 U/L
BASOPHILS # BLD AUTO: 0.02 K/UL
BASOPHILS NFR BLD AUTO: 0.3 %
BILIRUB SERPL-MCNC: 0.3 MG/DL
BUN SERPL-MCNC: 12 MG/DL
CALCIUM SERPL-MCNC: 9.8 MG/DL
CHLORIDE SERPL-SCNC: 101 MMOL/L
CO2 SERPL-SCNC: 21 MMOL/L
CREAT SERPL-MCNC: 0.72 MG/DL
EGFR: 117 ML/MIN/1.73M2
EOSINOPHIL # BLD AUTO: 0.08 K/UL
EOSINOPHIL NFR BLD AUTO: 1.1 %
ESTIMATED AVERAGE GLUCOSE: 105 MG/DL
FERRITIN SERPL-MCNC: 183 NG/ML
GLUCOSE SERPL-MCNC: 88 MG/DL
HBA1C MFR BLD HPLC: 5.3 %
HCT VFR BLD CALC: 38.6 %
HGB BLD-MCNC: 12.4 G/DL
IMM GRANULOCYTES NFR BLD AUTO: 0.1 %
IRON SATN MFR SERPL: 16 %
IRON SERPL-MCNC: 57 UG/DL
LYMPHOCYTES # BLD AUTO: 1.56 K/UL
LYMPHOCYTES NFR BLD AUTO: 22.3 %
MAN DIFF?: NORMAL
MCHC RBC-ENTMCNC: 26.9 PG
MCHC RBC-ENTMCNC: 32.1 GM/DL
MCV RBC AUTO: 83.7 FL
MONOCYTES # BLD AUTO: 0.43 K/UL
MONOCYTES NFR BLD AUTO: 6.1 %
NEUTROPHILS # BLD AUTO: 4.9 K/UL
NEUTROPHILS NFR BLD AUTO: 70.1 %
PLATELET # BLD AUTO: 263 K/UL
POTASSIUM SERPL-SCNC: 4.2 MMOL/L
PROT SERPL-MCNC: 7.2 G/DL
RBC # BLD: 4.61 M/UL
RBC # FLD: 13 %
SODIUM SERPL-SCNC: 138 MMOL/L
TIBC SERPL-MCNC: 352 UG/DL
TRANSFERRIN SERPL-MCNC: 292 MG/DL
TSH SERPL-ACNC: 2.65 UIU/ML
UIBC SERPL-MCNC: 294 UG/DL
WBC # FLD AUTO: 7 K/UL

## 2024-08-09 NOTE — HEALTH RISK ASSESSMENT
[Fair] : ~his/her~ current health as fair  [Good] : ~his/her~  mood as  good [No] : No [Little interest or pleasure doing things] : 1) Little interest or pleasure doing things [Feeling down, depressed, or hopeless] : 2) Feeling down, depressed, or hopeless [0] : 2) Feeling down, depressed, or hopeless: Not at all (0) [PHQ-2 Negative - No further assessment needed] : PHQ-2 Negative - No further assessment needed [Former] : Former [NO] : No [With Family] : lives with family [# of Members in Household ___] :  household currently consist of [unfilled] member(s) [Employed] : employed [College] : College [Single] : single [Feels Safe at Home] : Feels safe at home [de-identified] : cardio, weights [de-identified] : eggs, coffee, salad, chicken, rice, fruits, fish [de-identified] : quit 3 years ago (2021), 20cigs per month [Sexually Active] : not sexually active [High Risk Behavior] : no high risk behavior [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

## 2024-08-09 NOTE — INTERPRETER SERVICES
[FreeTextEntry3] :  Patient declined  service. Patient felt comfortable speaking with provider in shared language, Thai.

## 2024-08-09 NOTE — HISTORY OF PRESENT ILLNESS
[FreeTextEntry1] : CPE and HPV vaccine [de-identified] : 29F no significant PMH presents for CPE and 3rd dose of HPV vaccine. Previous doses on 3/9/23 and 12/22/23. Pt reports feeling tired, and fatigued for the last 4 months. Pt correlates feeling tired after getting abdominal and back liposuction in January 2024. LMP 7/19, lasts 4 days, regular, not sexually active on OCP for contraception; no refills needed. Denies depression, anxiety, change in diet or lack of interest. Denies fever, chills, chest pain, abdominal pain, diarrhea, dysuria, recent stressors, lifestyle changes or muscle pain.

## 2024-08-09 NOTE — HISTORY OF PRESENT ILLNESS
[FreeTextEntry1] : CPE and HPV vaccine [de-identified] : 29F no significant PMH presents for CPE and 3rd dose of HPV vaccine. Previous doses on 3/9/23 and 12/22/23. Pt reports feeling tired, and fatigued for the last 4 months. Pt correlates feeling tired after getting abdominal and back liposuction in January 2024. LMP 7/19, lasts 4 days, regular, not sexually active on OCP for contraception; no refills needed. Denies depression, anxiety, change in diet or lack of interest. Denies fever, chills, chest pain, abdominal pain, diarrhea, dysuria, recent stressors, lifestyle changes or muscle pain.

## 2024-08-09 NOTE — INTERPRETER SERVICES
[FreeTextEntry3] :  Patient declined  service. Patient felt comfortable speaking with provider in shared language, Persian.

## 2024-08-09 NOTE — HEALTH RISK ASSESSMENT
[Fair] : ~his/her~ current health as fair  [Good] : ~his/her~  mood as  good [No] : No [Little interest or pleasure doing things] : 1) Little interest or pleasure doing things [Feeling down, depressed, or hopeless] : 2) Feeling down, depressed, or hopeless [0] : 2) Feeling down, depressed, or hopeless: Not at all (0) [PHQ-2 Negative - No further assessment needed] : PHQ-2 Negative - No further assessment needed [Former] : Former [NO] : No [With Family] : lives with family [# of Members in Household ___] :  household currently consist of [unfilled] member(s) [Employed] : employed [College] : College [Single] : single [Feels Safe at Home] : Feels safe at home [de-identified] : cardio, weights [de-identified] : eggs, coffee, salad, chicken, rice, fruits, fish [de-identified] : quit 3 years ago (2021), 20cigs per month [Sexually Active] : not sexually active [High Risk Behavior] : no high risk behavior [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

## 2024-08-09 NOTE — HISTORY OF PRESENT ILLNESS
[FreeTextEntry1] : CPE and HPV vaccine [de-identified] : 29F no significant PMH presents for CPE and 3rd dose of HPV vaccine. Previous doses on 3/9/23 and 12/22/23. Pt reports feeling tired, and fatigued for the last 4 months. Pt correlates feeling tired after getting abdominal and back liposuction in January 2024. LMP 7/19, lasts 4 days, regular, not sexually active on OCP for contraception; no refills needed. Denies depression, anxiety, change in diet or lack of interest. Denies fever, chills, chest pain, abdominal pain, diarrhea, dysuria, recent stressors, lifestyle changes or muscle pain.

## 2024-08-09 NOTE — HEALTH RISK ASSESSMENT
[Fair] : ~his/her~ current health as fair  [Good] : ~his/her~  mood as  good [No] : No [Little interest or pleasure doing things] : 1) Little interest or pleasure doing things [Feeling down, depressed, or hopeless] : 2) Feeling down, depressed, or hopeless [0] : 2) Feeling down, depressed, or hopeless: Not at all (0) [PHQ-2 Negative - No further assessment needed] : PHQ-2 Negative - No further assessment needed [Former] : Former [NO] : No [With Family] : lives with family [# of Members in Household ___] :  household currently consist of [unfilled] member(s) [Employed] : employed [College] : College [Single] : single [Feels Safe at Home] : Feels safe at home [de-identified] : cardio, weights [de-identified] : eggs, coffee, salad, chicken, rice, fruits, fish [de-identified] : quit 3 years ago (2021), 20cigs per month [Sexually Active] : not sexually active [High Risk Behavior] : no high risk behavior [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

## 2024-08-09 NOTE — INTERPRETER SERVICES
[FreeTextEntry3] :  Patient declined  service. Patient felt comfortable speaking with provider in shared language, Bengali.

## 2024-08-13 ENCOUNTER — APPOINTMENT (OUTPATIENT)
Dept: FAMILY MEDICINE | Facility: HOSPITAL | Age: 29
End: 2024-08-13

## 2024-08-13 VITALS
BODY MASS INDEX: 27.29 KG/M2 | SYSTOLIC BLOOD PRESSURE: 116 MMHG | OXYGEN SATURATION: 97 % | TEMPERATURE: 98.4 F | WEIGHT: 164 LBS | RESPIRATION RATE: 14 BRPM | HEART RATE: 68 BPM | DIASTOLIC BLOOD PRESSURE: 77 MMHG

## 2024-08-13 DIAGNOSIS — G47.00 INSOMNIA, UNSPECIFIED: ICD-10-CM

## 2024-08-13 DIAGNOSIS — R53.83 OTHER FATIGUE: ICD-10-CM

## 2024-08-14 NOTE — INTERPRETER SERVICES
[FreeTextEntry3] :  Patient declined  service. Patient felt comfortable speaking with provider in shared language, Romansh.

## 2024-08-14 NOTE — INTERPRETER SERVICES
[FreeTextEntry3] :  Patient declined  service. Patient felt comfortable speaking with provider in shared language, Georgian.

## 2024-08-14 NOTE — HISTORY OF PRESENT ILLNESS
[FreeTextEntry1] : f/u fatigue [de-identified] : 29F no significant PMH presents for f/u lab and fatigue. Labs wnl except for high ferritin, normal iron and transferrin; normal cbc. Pt c/o fatigue and irregular sleep x 4months. Pt reports variable sleeping times, somedays sleeping 12hrs, sometimes unable to sleep but naps a lot. Pt states she feels tired and lacks motivation to go out recently. 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 Pt states she likes being active and exercises 1.5hrs for 5-6days; mostly cardio or swimming. Denies recent travels, fever, chills, chest pain, abdominal pain, nausea, vomiting or dysuria.

## 2024-08-14 NOTE — END OF VISIT
[] : Resident [FreeTextEntry3] : if ferritin level stays high and patient's fatigue persist will consider autoimmune disorder work up.

## 2024-08-14 NOTE — HISTORY OF PRESENT ILLNESS
[FreeTextEntry1] : f/u fatigue [de-identified] : 29F no significant PMH presents for f/u lab and fatigue. Labs wnl except for high ferritin, normal iron and transferrin; normal cbc. Pt c/o fatigue and irregular sleep x 4months. Pt reports variable sleeping times, somedays sleeping 12hrs, sometimes unable to sleep but naps a lot. Pt states she feels tired and lacks motivation to go out recently. 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 Pt states she likes being active and exercises 1.5hrs for 5-6days; mostly cardio or swimming. Denies recent travels, fever, chills, chest pain, abdominal pain, nausea, vomiting or dysuria.

## 2024-09-13 ENCOUNTER — APPOINTMENT (OUTPATIENT)
Dept: FAMILY MEDICINE | Facility: HOSPITAL | Age: 29
End: 2024-09-13

## 2025-03-03 ENCOUNTER — APPOINTMENT (OUTPATIENT)
Age: 30
End: 2025-03-03

## 2025-03-03 ENCOUNTER — OUTPATIENT (OUTPATIENT)
Dept: OUTPATIENT SERVICES | Facility: HOSPITAL | Age: 30
LOS: 1 days | End: 2025-03-03
Payer: SELF-PAY

## 2025-03-03 VITALS
OXYGEN SATURATION: 100 % | HEART RATE: 98 BPM | RESPIRATION RATE: 16 BRPM | BODY MASS INDEX: 26.29 KG/M2 | WEIGHT: 158 LBS | DIASTOLIC BLOOD PRESSURE: 81 MMHG | SYSTOLIC BLOOD PRESSURE: 118 MMHG

## 2025-03-03 DIAGNOSIS — H43.12 VITREOUS HEMORRHAGE, LEFT EYE: ICD-10-CM

## 2025-03-03 DIAGNOSIS — Z00.00 ENCOUNTER FOR GENERAL ADULT MEDICAL EXAMINATION WITHOUT ABNORMAL FINDINGS: ICD-10-CM

## 2025-03-03 PROCEDURE — G0463: CPT

## 2025-08-20 ENCOUNTER — APPOINTMENT (OUTPATIENT)
Age: 30
End: 2025-08-20

## 2025-08-20 ENCOUNTER — OUTPATIENT (OUTPATIENT)
Dept: OUTPATIENT SERVICES | Facility: HOSPITAL | Age: 30
LOS: 1 days | End: 2025-08-20
Payer: SELF-PAY

## 2025-08-20 VITALS
OXYGEN SATURATION: 98 % | DIASTOLIC BLOOD PRESSURE: 81 MMHG | TEMPERATURE: 98 F | RESPIRATION RATE: 16 BRPM | HEART RATE: 64 BPM | SYSTOLIC BLOOD PRESSURE: 117 MMHG

## 2025-08-20 DIAGNOSIS — Z00.00 ENCOUNTER FOR GENERAL ADULT MEDICAL EXAMINATION WITHOUT ABNORMAL FINDINGS: ICD-10-CM

## 2025-08-20 DIAGNOSIS — Z30.9 ENCOUNTER FOR CONTRACEPTIVE MANAGEMENT, UNSPECIFIED: ICD-10-CM

## 2025-08-20 PROCEDURE — G0463: CPT
